# Patient Record
Sex: FEMALE | Race: WHITE | NOT HISPANIC OR LATINO | Employment: FULL TIME | URBAN - METROPOLITAN AREA
[De-identification: names, ages, dates, MRNs, and addresses within clinical notes are randomized per-mention and may not be internally consistent; named-entity substitution may affect disease eponyms.]

---

## 2017-04-20 ENCOUNTER — TELEPHONE (OUTPATIENT)
Dept: ONCOLOGY | Facility: CLINIC | Age: 52
End: 2017-04-20

## 2017-04-20 NOTE — TELEPHONE ENCOUNTER
----- Message from Lidia Emerson sent at 4/20/2017  9:57 AM EDT -----  Regarding: [facility]PAT[facility]  Contact: [phone]  Leonor interiano mom called and wanted to let  know she is in [facility] with pneumonia and is receiving antibiotics and other medications.

## 2017-05-31 PROBLEM — M25.50 DIFFUSE ARTHRALGIA: Status: ACTIVE | Noted: 2017-05-31

## 2017-05-31 PROBLEM — R74.01 ELEVATED TRANSAMINASE LEVEL: Status: ACTIVE | Noted: 2017-05-31

## 2017-05-31 PROBLEM — M85.80 OSTEOPENIA: Status: ACTIVE | Noted: 2017-05-31

## 2017-05-31 PROBLEM — R74.8 ALKALINE PHOSPHATASE ELEVATION: Status: ACTIVE | Noted: 2017-05-31

## 2017-07-17 ENCOUNTER — TRANSCRIBE ORDERS (OUTPATIENT)
Dept: NUTRITION | Facility: HOSPITAL | Age: 52
End: 2017-07-17

## 2017-07-17 DIAGNOSIS — K59.1 FUNCTIONAL DIARRHEA: Primary | ICD-10-CM

## 2017-07-19 ENCOUNTER — TRANSCRIBE ORDERS (OUTPATIENT)
Dept: NUTRITION | Facility: HOSPITAL | Age: 52
End: 2017-07-19

## 2017-08-01 ENCOUNTER — HOSPITAL ENCOUNTER (OUTPATIENT)
Dept: NUTRITION | Facility: HOSPITAL | Age: 52
Setting detail: RECURRING SERIES
Discharge: HOME | End: 2017-08-01

## 2017-08-01 VITALS — WEIGHT: 155 LBS | BODY MASS INDEX: 25.83 KG/M2 | HEIGHT: 65 IN

## 2017-08-01 PROCEDURE — 97802 MEDICAL NUTRITION INDIV IN: CPT | Performed by: DIETITIAN, REGISTERED

## 2017-08-17 ENCOUNTER — HOSPITAL ENCOUNTER (OUTPATIENT)
Dept: NUTRITION | Facility: HOSPITAL | Age: 52
Setting detail: RECURRING SERIES
End: 2017-08-17

## 2017-08-22 ENCOUNTER — LAB (OUTPATIENT)
Dept: LAB | Facility: HOSPITAL | Age: 52
End: 2017-08-22

## 2017-08-22 ENCOUNTER — OFFICE VISIT (OUTPATIENT)
Dept: ONCOLOGY | Facility: CLINIC | Age: 52
End: 2017-08-22

## 2017-08-22 VITALS
DIASTOLIC BLOOD PRESSURE: 78 MMHG | HEART RATE: 92 BPM | TEMPERATURE: 97.9 F | BODY MASS INDEX: 25.11 KG/M2 | SYSTOLIC BLOOD PRESSURE: 120 MMHG | RESPIRATION RATE: 16 BRPM | HEIGHT: 65 IN | WEIGHT: 150.7 LBS

## 2017-08-22 DIAGNOSIS — C50.212 MALIGNANT NEOPLASM OF UPPER-INNER QUADRANT OF LEFT FEMALE BREAST (HCC): ICD-10-CM

## 2017-08-22 DIAGNOSIS — R74.01 ELEVATED TRANSAMINASE LEVEL: ICD-10-CM

## 2017-08-22 DIAGNOSIS — R74.8 ALKALINE PHOSPHATASE ELEVATION: ICD-10-CM

## 2017-08-22 DIAGNOSIS — C50.212 MALIGNANT NEOPLASM OF UPPER-INNER QUADRANT OF LEFT FEMALE BREAST (HCC): Primary | ICD-10-CM

## 2017-08-22 LAB
ALBUMIN SERPL-MCNC: 4.7 G/DL (ref 3.2–4.8)
ALBUMIN/GLOB SERPL: 1.7 G/DL (ref 1.5–2.5)
ALP SERPL-CCNC: 180 U/L (ref 25–100)
ALT SERPL W P-5'-P-CCNC: 61 U/L (ref 7–40)
ANION GAP SERPL CALCULATED.3IONS-SCNC: 8 MMOL/L (ref 3–11)
AST SERPL-CCNC: 37 U/L (ref 0–33)
BILIRUB SERPL-MCNC: 0.5 MG/DL (ref 0.3–1.2)
BUN BLD-MCNC: 13 MG/DL (ref 9–23)
BUN/CREAT SERPL: 14.4 (ref 7–25)
CALCIUM SPEC-SCNC: 9.8 MG/DL (ref 8.7–10.4)
CHLORIDE SERPL-SCNC: 105 MMOL/L (ref 99–109)
CO2 SERPL-SCNC: 27 MMOL/L (ref 20–31)
CREAT BLD-MCNC: 0.9 MG/DL (ref 0.6–1.3)
GFR SERPL CREATININE-BSD FRML MDRD: 67 ML/MIN/1.73
GLOBULIN UR ELPH-MCNC: 2.8 GM/DL
GLUCOSE BLD-MCNC: 91 MG/DL (ref 70–100)
POTASSIUM BLD-SCNC: 3.7 MMOL/L (ref 3.5–5.5)
PROT SERPL-MCNC: 7.5 G/DL (ref 5.7–8.2)
SODIUM BLD-SCNC: 140 MMOL/L (ref 132–146)

## 2017-08-22 PROCEDURE — 99214 OFFICE O/P EST MOD 30 MIN: CPT | Performed by: NURSE PRACTITIONER

## 2017-08-22 PROCEDURE — 80053 COMPREHEN METABOLIC PANEL: CPT | Performed by: NURSE PRACTITIONER

## 2017-08-22 PROCEDURE — 36415 COLL VENOUS BLD VENIPUNCTURE: CPT

## 2017-08-22 RX ORDER — TAMOXIFEN CITRATE 20 MG/1
20 TABLET ORAL DAILY
Qty: 30 TABLET | Refills: 11 | Status: SHIPPED | OUTPATIENT
Start: 2017-08-22 | End: 2022-05-27

## 2017-08-22 NOTE — PROGRESS NOTES
CHIEF COMPLAINT: 1.  Diffuse body aches                                       2.  Follow-up for left breast cancer    Problem List:  Oncology/Hematology History    1. Left breast cancer, [id number], infiltrating ductal carcinoma of the left  breast. Goodrich-Ramirez 4 out of 9, primary 1.8 cm with mucinous features,  stage IA, hormone receptor positive, HER-2/jaswinder 0+:   a) Status post bilateral mastectomy 09/30/2015. Right breast prophylactic  simple mastectomy fibrocystic change with focal fibroadenomata changes, skin  and nipple unremarkable.   b) Low risk Oncotype score of 13 with a 10 year risk of distant recurrence  with 5 years of tamoxifen alone at 8%.  c) Began Arimidex 10/28/2015.   d) Stopped Arimidex, 03/2016 due to diffuse bony aches and pains.   e) Therapy changed to exemestane, 04/20/2016.  2. Elevated alkaline phosphatase:  a) Total body bone scan 11/02/2015 negative.  b) Fractionated alkaline phosphatase 01/25/2016 was normal with intestinal  fraction 3%, bone fraction 33%, liver fraction 64% with total alkaline  phosphatase of 148.  3. History of CHRISTIAN/BSO 2008.  4. Elevated transaminases with right upper quadrant abdominal pain:   a) CT of the abdomen, 04/19/2016 revealed marked steatosis of the liver  without focal liver mass.   b) On 04/19/2016, hepatic function panel with an albumin of 4.7, ALT 99, AST  59, alkaline phosphatase 198, total bilirubin 0.8.   5. Osteopenia: On 04/18/2016, bone density testing revealed osteopenia in the  lumbar spine and femoral necks, overall normal bone mineral density in both  hips.         Malignant neoplasm of upper-inner quadrant of left female breast    9/30/2015 Initial Diagnosis     Malignant neoplasm of upper-inner quadrant of left female breast         11/2/2015 Imaging     Total body bone scan IMPRESSION- Arthritic changes. Negative for metastatic disease.         4/18/2016 Imaging     DEXA Bone Density Scan IMPRESSION- Osteopenia in the lumbar spine. Overall  normal bone mineral  density in both hips with osteopenia in the femoral necks.         2016 Imaging     CT Abdomen IMPRESSION-   1.  Marked steatosis of the liver without focal liver mass. Acute focal  CT abnormality in the abdomen is not otherwise identified.  2.  Evidence of visceral tumor, adenopathy or stranding is not  identified. Signs of active malignancy or metastatic disease is not  appreciated as described.         2016 Imaging     Total body bone scan IMPRESSION-   1.  Negative radionuclide bone data set for focal dominant abnormality  or evidence of active metastatic disease.  2.  Stable tracer distribution when carefully compared to 2015  series.  3.  New or acute abnormalities are not currently identified.            HISTORY OF PRESENT ILLNESS:  The patient is a 51 y.o. female, here for follow up on management of left breast cancer.  The patient states that she was admitted to [facility] in March with double pneumonia and has not felt well since.  She states that she continues to have diffuse bony aches and pains that have been persistent since her breast cancer diagnosis and had been made worse by the medications we have given her.  She was intolerant of Arimidex, we then put her on exemestane and she stopped taking that in April.  She states that she feels better off of the medication.  She states her bony aches are about the same however her feet seem to hurt more, she states when she gets out of bed in the morning the pain in her feet is worse and improves as she moves about.      Past Medical History:   Diagnosis Date   • Fractures    • Malignant neoplasm of upper inner quadrant of female breast    • Stomach problems      Past Surgical History:   Procedure Laterality Date   •  SECTION     • CHOLECYSTECTOMY     • HYSTERECTOMY      CHRISTIAN/BSO   • MASTECTOMY Bilateral 2015    Double   • WRIST SURGERY         No Known Allergies    Family History and  "Social History reviewed and changed as necessary      REVIEW OF SYSTEM:   Review of Systems   Constitutional: Negative for appetite change, chills, diaphoresis, fatigue, fever and unexpected weight change.   HENT:   Negative for mouth sores, sore throat and trouble swallowing.    Eyes: Negative for icterus.   Respiratory: Negative for cough, hemoptysis and shortness of breath.    Cardiovascular: Negative for chest pain, leg swelling and palpitations.   Gastrointestinal: Negative for abdominal distention, abdominal pain, blood in stool, constipation, diarrhea, nausea and vomiting.   Endocrine: Negative for hot flashes.   Genitourinary: Negative for bladder incontinence, difficulty urinating, dysuria, frequency and hematuria.    Musculoskeletal: Positive for diffuse arthralgias, worsen her feet bilaterally.   Skin: Negative for rash.   Neurological: Negative for dizziness, gait problem, headaches, light-headedness and numbness.   Hematological: Negative for adenopathy. Does not bruise/bleed easily.   Psychiatric/Behavioral: Negative for depression. The patient is not nervous/anxious.    All other systems reviewed and are negative except as stated above in the history of present illness.       PHYSICAL EXAM    Vitals:    08/22/17 1505   BP: 120/78   Pulse: 92   Resp: 16   Temp: 97.9 °F (36.6 °C)   TempSrc: Temporal Artery    Weight: 150 lb 11.2 oz (68.4 kg)   Height: 65\" (165.1 cm)     Constitutional: Appears well-developed and well-nourished. No distress.   ECOG: (0) Fully active, able to carry on all predisease performance without restriction  HENT:   Head: Normocephalic.   Mouth/Throat: Oropharynx is clear and moist.   Eyes: Conjunctivae are normal. Pupils are equal, round, and reactive to light. No scleral icterus.   Neck: Neck supple. No JVD present. No thyromegaly present.   Cardiovascular: Normal rate, regular rhythm and normal heart sounds.    Pulmonary/Chest: Breath sounds normal. No respiratory distress. "   Chest: Skin tissue over bilateral reconstructed breast is soft, no abnormal masses or nodules, no rashes or lesions.  Keloid-type scar left axilla that is stable with no unusual tenderness or masses.  Nodes: No cervical, supraclavicular or axillary nodes palpable on exam.  Abdominal: Soft. Exhibits no distension. There is no hepatosplenomegaly. There is no tenderness.   Musculoskeletal:Exhibits no edema, tenderness or deformity.   Neurological: Alert and oriented to person, place, and time. Exhibits normal muscle tone.   Skin: No ecchymosis, no petechiae and no rash noted. Not diaphoretic. No cyanosis. Nails show no clubbing.   Psychiatric: Normal mood and affect.   Vitals reviewed.    Diagnostic data: All previous office notes, previous radiology reports and laboratory data were reviewed at time of visit.    Assessment/Plan     1.  History of stage IA hormone receptor positive left breast cancer  2.  Severe arthralgias worsened with aromatase inhibitor therapy  3.  History of elevated transaminases  4.  MAGANA    Discussion:  The patient has been intolerant of aromatase inhibitors thus far not tolerating Arimidex initially, we then switched her to exemestane and I thought she was tolerating that but when she came in today she informed me that she hasn't been taking it for 3 months and feels better off of it.  We discussed switching to Femara but I am concerned she would not tolerate that either.  We will try a trial of tamoxifen 20 mg daily as she may tolerate this much better.  We did discuss the possibility of not being able to take it due to her history of elevated transaminases.  I will check her CMP today and again in 3 months when I see her back.  She also continues to follow along with Dr. De Jesus in regards to her MAGANA.  If she is intolerant of tamoxifen then she states she will just go with watchful waiting and did not want to try any other adjuvant therapy.  She has had bilateral mastectomies therefore no  mammograms are needed.  She asked about blood work in imaging for surveillance and I discussed with her the reasoning behind no routine scans or blood work in the absence of symptoms.  Her arthralgias are basically stable from what she has had over the past 2 years therefore I do not feel it necessary to repeat her bone scan again as we have done this twice.  She does have significant arthralgias has never seen rheumatology.  She would like to try to see one locally and will ask her primary care provider for referral.  I will see her back in 3 months for follow-up to see how she is tolerating tamoxifen.  I've asked her to please call me if she has any concerns prior to that were she is unable to take the medication and I will be glad to see her sooner.    25 minutes of today's 30 minute appointment was spent in counseling and education regarding the above.          Danielle Stone, APRN    08/22/2017

## 2017-08-23 ENCOUNTER — TELEPHONE (OUTPATIENT)
Dept: ONCOLOGY | Facility: CLINIC | Age: 52
End: 2017-08-23

## 2017-08-23 NOTE — TELEPHONE ENCOUNTER
I called Carole and reviewed the results of her CMP with her.  Transaminases were just a little elevated.  She will begin tamoxifen and we will recheck CMP again after she has been on for a few months.  She understands to notify us if she is intolerant of tamoxifen or has any significant side effects.  Otherwise we will see her back as scheduled in 3 months.

## 2017-08-28 ENCOUNTER — TELEPHONE (OUTPATIENT)
Dept: NUTRITION | Facility: HOSPITAL | Age: 52
End: 2017-08-28

## 2017-08-28 NOTE — PROGRESS NOTES
"Spoke with patient over the phone on progress since initial nutrition appointment with RD at the beginning of the month. Has not been following low FODMAP elimination diet completely but states \"I'm making much more of a conscious effort with my food choices\". Has not been tracking her intake or symptoms. Says at her last oncology appointment all liver enzyme labs were down and she is having \"more normal bowel habits\", and is very pleased by this. Also has lost 2-3 pounds since initial appointment. Is busy between working 4 jobs and granddaughter is sick- does not know when she can come back in for appointment with RD. Has contact information, will call when her schedule is not so busy.  RD instructed patient on how to modify elimination phase for long-term management with FODMAP diet and encouraged keeping a journal with her symptoms to pinpoint trigger foods. Denies having any questions or concerns for RD today. Thank you for this referral.  "

## 2017-09-22 ENCOUNTER — TELEPHONE (OUTPATIENT)
Dept: ONCOLOGY | Facility: CLINIC | Age: 52
End: 2017-09-22

## 2017-09-22 NOTE — TELEPHONE ENCOUNTER
The patient called stating that she would like to make an appointment to discuss side effects from the medication she is taking and her ability to work.  Scheduling will put her on in the next few weeks to see Dr. Best.

## 2017-10-03 ENCOUNTER — LAB (OUTPATIENT)
Dept: LAB | Facility: HOSPITAL | Age: 52
End: 2017-10-03

## 2017-10-03 ENCOUNTER — OFFICE VISIT (OUTPATIENT)
Dept: ONCOLOGY | Facility: CLINIC | Age: 52
End: 2017-10-03

## 2017-10-03 VITALS
TEMPERATURE: 97.7 F | DIASTOLIC BLOOD PRESSURE: 85 MMHG | WEIGHT: 151.6 LBS | SYSTOLIC BLOOD PRESSURE: 126 MMHG | HEIGHT: 65 IN | RESPIRATION RATE: 16 BRPM | HEART RATE: 89 BPM | BODY MASS INDEX: 25.26 KG/M2

## 2017-10-03 DIAGNOSIS — C50.212 MALIGNANT NEOPLASM OF UPPER-INNER QUADRANT OF LEFT FEMALE BREAST, UNSPECIFIED ESTROGEN RECEPTOR STATUS (HCC): ICD-10-CM

## 2017-10-03 DIAGNOSIS — C50.212 MALIGNANT NEOPLASM OF UPPER-INNER QUADRANT OF LEFT FEMALE BREAST, UNSPECIFIED ESTROGEN RECEPTOR STATUS (HCC): Primary | ICD-10-CM

## 2017-10-03 LAB
ALBUMIN SERPL-MCNC: 4.7 G/DL (ref 3.2–4.8)
ALBUMIN/GLOB SERPL: 1.6 G/DL (ref 1.5–2.5)
ALP SERPL-CCNC: 198 U/L (ref 25–100)
ALT SERPL W P-5'-P-CCNC: 84 U/L (ref 7–40)
ANION GAP SERPL CALCULATED.3IONS-SCNC: 7 MMOL/L (ref 3–11)
AST SERPL-CCNC: 47 U/L (ref 0–33)
BILIRUB SERPL-MCNC: 0.6 MG/DL (ref 0.3–1.2)
BUN BLD-MCNC: 11 MG/DL (ref 9–23)
BUN/CREAT SERPL: 11 (ref 7–25)
CALCIUM SPEC-SCNC: 10.3 MG/DL (ref 8.7–10.4)
CHLORIDE SERPL-SCNC: 104 MMOL/L (ref 99–109)
CO2 SERPL-SCNC: 25 MMOL/L (ref 20–31)
CREAT BLD-MCNC: 1 MG/DL (ref 0.6–1.3)
ERYTHROCYTE [DISTWIDTH] IN BLOOD BY AUTOMATED COUNT: 13 % (ref 11.3–14.5)
GFR SERPL CREATININE-BSD FRML MDRD: 59 ML/MIN/1.73
GLOBULIN UR ELPH-MCNC: 2.9 GM/DL
GLUCOSE BLD-MCNC: 92 MG/DL (ref 70–100)
HCT VFR BLD AUTO: 44 % (ref 34.5–44)
HGB BLD-MCNC: 14.6 G/DL (ref 11.5–15.5)
LYMPHOCYTES # BLD AUTO: 2.3 10*3/MM3 (ref 0.6–4.8)
LYMPHOCYTES NFR BLD AUTO: 22.8 % (ref 24–44)
MCH RBC QN AUTO: 30.3 PG (ref 27–31)
MCHC RBC AUTO-ENTMCNC: 33.2 G/DL (ref 32–36)
MCV RBC AUTO: 91.4 FL (ref 80–99)
MONOCYTES # BLD AUTO: 0.4 10*3/MM3 (ref 0–1)
MONOCYTES NFR BLD AUTO: 3.6 % (ref 0–12)
NEUTROPHILS # BLD AUTO: 7.5 10*3/MM3 (ref 1.5–8.3)
NEUTROPHILS NFR BLD AUTO: 73.6 % (ref 41–71)
PLATELET # BLD AUTO: 204 10*3/MM3 (ref 150–450)
PMV BLD AUTO: 9 FL (ref 6–12)
POTASSIUM BLD-SCNC: 3.8 MMOL/L (ref 3.5–5.5)
PROT SERPL-MCNC: 7.6 G/DL (ref 5.7–8.2)
RBC # BLD AUTO: 4.81 10*6/MM3 (ref 3.89–5.14)
SODIUM BLD-SCNC: 136 MMOL/L (ref 132–146)
WBC NRBC COR # BLD: 10.2 10*3/MM3 (ref 3.5–10.8)

## 2017-10-03 PROCEDURE — 99214 OFFICE O/P EST MOD 30 MIN: CPT | Performed by: INTERNAL MEDICINE

## 2017-10-03 PROCEDURE — 85025 COMPLETE CBC W/AUTO DIFF WBC: CPT

## 2017-10-03 PROCEDURE — 80053 COMPREHEN METABOLIC PANEL: CPT | Performed by: INTERNAL MEDICINE

## 2017-10-03 PROCEDURE — 36415 COLL VENOUS BLD VENIPUNCTURE: CPT

## 2017-10-03 NOTE — PROGRESS NOTES
CHIEF COMPLAINT:fu breast cancer management    Problem List:  Oncology/Hematology History    1. Left breast cancer, [id number], infiltrating ductal carcinoma of the left  breast. Goodrich-Ramirez 4 out of 9, primary 1.8 cm with mucinous features,  stage IA, hormone receptor positive, HER-2/jaswinder 0+:   a) Status post bilateral mastectomy 09/30/2015. Right breast prophylactic  simple mastectomy fibrocystic change with focal fibroadenomata changes, skin  and nipple unremarkable.   b) Low risk Oncotype score of 13 with a 10 year risk of distant recurrence  with 5 years of tamoxifen alone at 8%.  c) Began Arimidex 10/28/2015.   d) Stopped Arimidex, 03/2016 due to diffuse bony aches and pains.   e) Therapy changed to exemestane, 04/20/2016.  2. Elevated alkaline phosphatase:  a) Total body bone scan 11/02/2015 negative.  b) Fractionated alkaline phosphatase 01/25/2016 was normal with intestinal  fraction 3%, bone fraction 33%, liver fraction 64% with total alkaline  phosphatase of 148.  3. History of CHRISTIAN/BSO 2008.  4. Elevated transaminases with right upper quadrant abdominal pain:   a) CT of the abdomen, 04/19/2016 revealed marked steatosis of the liver  without focal liver mass.   b) On 04/19/2016, hepatic function panel with an albumin of 4.7, ALT 99, AST  59, alkaline phosphatase 198, total bilirubin 0.8.   5. Osteopenia: On 04/18/2016, bone density testing revealed osteopenia in the  lumbar spine and femoral necks, overall normal bone mineral density in both  hips.         Malignant neoplasm of upper-inner quadrant of left female breast    9/30/2015 Initial Diagnosis     Malignant neoplasm of upper-inner quadrant of left female breast         11/2/2015 Imaging     Total body bone scan IMPRESSION- Arthritic changes. Negative for metastatic disease.         4/18/2016 Imaging     DEXA Bone Density Scan IMPRESSION- Osteopenia in the lumbar spine. Overall normal bone mineral  density in both hips with osteopenia in the femoral  necks.         2016 Imaging     CT Abdomen IMPRESSION-   1.  Marked steatosis of the liver without focal liver mass. Acute focal  CT abnormality in the abdomen is not otherwise identified.  2.  Evidence of visceral tumor, adenopathy or stranding is not  identified. Signs of active malignancy or metastatic disease is not  appreciated as described.         2016 Imaging     Total body bone scan IMPRESSION-   1.  Negative radionuclide bone data set for focal dominant abnormality  or evidence of active metastatic disease.  2.  Stable tracer distribution when carefully compared to 2015  series.  3.  New or acute abnormalities are not currently identified.            HISTORY OF PRESENT ILLNESS:  The patient is a 52 y.o. female, here for follow up on management of breast ca on adjuvant tmx with severe hot flashes and bony aches that have improved in past off rx.  See above for prior manuvers. Had recent left axillary sebaceous cyst rx with atbtx that then caused yeast vaginitis that required diflucan.  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 And knows she can't risk working for fear of hurting others. Was treated and admitted to [facility] in April for Pneumonia.  Still with congestion and chronic cough without fevers.      Past Medical History:   Diagnosis Date   • Fractures    • Malignant neoplasm of upper inner quadrant of female breast    • Stomach problems      Past Surgical History:   Procedure Laterality Date   •  SECTION     • CHOLECYSTECTOMY     • HYSTERECTOMY      CHRISTIAN/BSO   • MASTECTOMY Bilateral 2015    Double   • WRIST SURGERY         No Known Allergies    Family History and Social History reviewed and changed as necessary      REVIEW OF SYSTEM:   Review of Systems   Constitutional: Negative for appetite change, chills, diaphoresis, fatigue, fever and unexpected weight change.   HENT:   Negative for mouth sores, sore throat and trouble  "swallowing.    Eyes: Negative for icterus.   Respiratory: Negative for cough, hemoptysis and shortness of breath.    Cardiovascular: Negative for chest pain, leg swelling and palpitations.   Gastrointestinal: Negative for abdominal distention, abdominal pain, blood in stool, constipation, diarrhea, nausea and vomiting.   Endocrine: Negative for hot flashes.   Genitourinary: Negative for bladder incontinence, difficulty urinating, dysuria, frequency and hematuria.    Musculoskeletal: Negative for gait problem, neck pain and neck stiffness.   Skin: Negative for rash.   Neurological: Negative for dizziness, gait problem, headaches, light-headedness and numbness.   Hematological: Negative for adenopathy. Does not bruise/bleed easily.   Psychiatric/Behavioral: Negative for depression. The patient is not nervous/anxious.    All other systems reviewed and are negative.       PHYSICAL EXAM    Vitals:    10/03/17 1010   BP: 126/85   Pulse: 89   Resp: 16   Temp: 97.7 °F (36.5 °C)   TempSrc: Temporal Artery    Weight: 151 lb 9.6 oz (68.8 kg)   Height: 65\" (165.1 cm)     Constitutional: Appears well-developed and well-nourished. No distress.   ECOG: (2) Ambulatory and capable of self care, unable to carry out work activity, up and about > 50% or waking hours  HENT:   Head: Normocephalic.   Mouth/Throat: Oropharynx is clear and moist.   Eyes: Conjunctivae are normal. Pupils are equal, round, and reactive to light. No scleral icterus.   Neck: Neck supple. No JVD present. No thyromegaly present.   Cardiovascular: Normal rate, regular rhythm and normal heart sounds.    Pulmonary/Chest: Breath sounds normal. No respiratory distress.   Abdominal: Soft. Exhibits no distension and no mass. There is no hepatosplenomegaly. There is no tenderness. There is no rebound and no guarding.   Musculoskeletal:Exhibits no edema, tenderness or deformity.   Neurological: Alert and oriented to person, place, and time. Exhibits normal muscle tone. "   Skin: No ecchymosis, no petechiae and no rash noted. Not diaphoretic. No cyanosis. Nails show no clubbing.   Psychiatric: Normal mood and affect.   Vitals reviewed.      No visits with results within 6 Week(s) from this visit.  Latest known visit with results is:    Lab on 08/22/2017   Component Date Value Ref Range Status   • Glucose 08/22/2017 91  70 - 100 mg/dL Final   • BUN 08/22/2017 13  9 - 23 mg/dL Final   • Creatinine 08/22/2017 0.90  0.60 - 1.30 mg/dL Final   • Sodium 08/22/2017 140  132 - 146 mmol/L Final   • Potassium 08/22/2017 3.7  3.5 - 5.5 mmol/L Final   • Chloride 08/22/2017 105  99 - 109 mmol/L Final   • CO2 08/22/2017 27.0  20.0 - 31.0 mmol/L Final   • Calcium 08/22/2017 9.8  8.7 - 10.4 mg/dL Final   • Total Protein 08/22/2017 7.5  5.7 - 8.2 g/dL Final   • Albumin 08/22/2017 4.70  3.20 - 4.80 g/dL Final   • ALT (SGPT) 08/22/2017 61* 7 - 40 U/L Final   • AST (SGOT) 08/22/2017 37* 0 - 33 U/L Final   • Alkaline Phosphatase 08/22/2017 180* 25 - 100 U/L Final   • Total Bilirubin 08/22/2017 0.5  0.3 - 1.2 mg/dL Final   • eGFR Non  Amer 08/22/2017 67  >60 mL/min/1.73 Final   • Globulin 08/22/2017 2.8  gm/dL Final   • A/G Ratio 08/22/2017 1.7  1.5 - 2.5 g/dL Final   • BUN/Creatinine Ratio 08/22/2017 14.4  7.0 - 25.0 Final   • Anion Gap 08/22/2017 8.0  3.0 - 11.0 mmol/L Final       Assessment/Plan     1.  Breast Cancer  2. Bone Aches  3. Chest congestion  4. Hx of elevated liver enzymes.  5. Hot flashes    Discussion:  Had mucinous features which is a good prognosis.  Had very low risk oncotype.  Will ck bone scan and Ct Chest and see back 1 week.  If no mets, she is going to try to soldier on with TMX but is going to pray about it.        Ebenezer Best MD    10/03/2017

## 2017-10-09 ENCOUNTER — HOSPITAL ENCOUNTER (OUTPATIENT)
Dept: NUCLEAR MEDICINE | Facility: HOSPITAL | Age: 52
Discharge: HOME | End: 2017-10-09
Attending: INTERNAL MEDICINE

## 2017-10-09 ENCOUNTER — OFFICE VISIT (OUTPATIENT)
Dept: ONCOLOGY | Facility: CLINIC | Age: 52
End: 2017-10-09

## 2017-10-09 ENCOUNTER — HOSPITAL ENCOUNTER (OUTPATIENT)
Dept: CT IMAGING | Facility: HOSPITAL | Age: 52
Discharge: HOME | End: 2017-10-09
Attending: INTERNAL MEDICINE | Admitting: INTERNAL MEDICINE

## 2017-10-09 VITALS
WEIGHT: 149.9 LBS | TEMPERATURE: 97.8 F | BODY MASS INDEX: 24.97 KG/M2 | RESPIRATION RATE: 16 BRPM | DIASTOLIC BLOOD PRESSURE: 84 MMHG | SYSTOLIC BLOOD PRESSURE: 134 MMHG | HEART RATE: 65 BPM | HEIGHT: 65 IN

## 2017-10-09 DIAGNOSIS — C50.212 MALIGNANT NEOPLASM OF UPPER-INNER QUADRANT OF LEFT FEMALE BREAST, UNSPECIFIED ESTROGEN RECEPTOR STATUS (HCC): ICD-10-CM

## 2017-10-09 DIAGNOSIS — R74.01 ELEVATED TRANSAMINASE LEVEL: Primary | ICD-10-CM

## 2017-10-09 DIAGNOSIS — M79.622 AXILLARY PAIN, LEFT: ICD-10-CM

## 2017-10-09 DIAGNOSIS — Z85.3 HISTORY OF LEFT BREAST CANCER: ICD-10-CM

## 2017-10-09 PROCEDURE — 71260 CT THORAX DX C+: CPT

## 2017-10-09 PROCEDURE — 99213 OFFICE O/P EST LOW 20 MIN: CPT | Performed by: NURSE PRACTITIONER

## 2017-10-09 PROCEDURE — A9503 TC99M MEDRONATE: HCPCS | Performed by: INTERNAL MEDICINE

## 2017-10-09 PROCEDURE — 0 TECHNETIUM MEDRONATE KIT: Performed by: INTERNAL MEDICINE

## 2017-10-09 PROCEDURE — 78306 BONE IMAGING WHOLE BODY: CPT

## 2017-10-09 PROCEDURE — 0 IOPAMIDOL 61 % SOLUTION: Performed by: INTERNAL MEDICINE

## 2017-10-09 RX ORDER — TC 99M MEDRONATE 20 MG/10ML
25.8 INJECTION, POWDER, LYOPHILIZED, FOR SOLUTION INTRAVENOUS
Status: COMPLETED | OUTPATIENT
Start: 2017-10-09 | End: 2017-10-09

## 2017-10-09 RX ADMIN — IOPAMIDOL 75 ML: 612 INJECTION, SOLUTION INTRAVENOUS at 09:40

## 2017-10-09 RX ADMIN — Medication 25.8 MILLICURIE: at 09:20

## 2017-10-09 NOTE — PROGRESS NOTES
CHIEF COMPLAINT: 1.  Left axillary pain and swelling                                       2.  Diffuse arthralgias                                       3.  Follow-up for history of left breast cancer      Problem List:  Oncology/Hematology History    1. Left breast cancer, [id number], infiltrating ductal carcinoma of the left  breast. Goodrich-Ramirez 4 out of 9, primary 1.8 cm with mucinous features,  stage IA, hormone receptor positive, HER-2/jaswinder 0+:   a) Status post bilateral mastectomy 09/30/2015. Right breast prophylactic  simple mastectomy fibrocystic change with focal fibroadenomata changes, skin  and nipple unremarkable.   b) Low risk Oncotype score of 13 with a 10 year risk of distant recurrence  with 5 years of tamoxifen alone at 8%.  c) Began Arimidex 10/28/2015.   d) Stopped Arimidex, 03/2016 due to diffuse bony aches and pains.   e) Therapy changed to exemestane, 04/20/2016.  2. Elevated alkaline phosphatase:  a) Total body bone scan 11/02/2015 negative.  b) Fractionated alkaline phosphatase 01/25/2016 was normal with intestinal  fraction 3%, bone fraction 33%, liver fraction 64% with total alkaline  phosphatase of 148.  3. History of CHRISTIAN/BSO 2008.  4. Elevated transaminases with right upper quadrant abdominal pain:   a) CT of the abdomen, 04/19/2016 revealed marked steatosis of the liver  without focal liver mass.   b) On 04/19/2016, hepatic function panel with an albumin of 4.7, ALT 99, AST  59, alkaline phosphatase 198, total bilirubin 0.8.   5. Osteopenia: On 04/18/2016, bone density testing revealed osteopenia in the  lumbar spine and femoral necks, overall normal bone mineral density in both  hips.         Malignant neoplasm of upper-inner quadrant of left female breast    9/30/2015 Initial Diagnosis     Malignant neoplasm of upper-inner quadrant of left female breast         11/2/2015 Imaging     Total body bone scan IMPRESSION- Arthritic changes. Negative for metastatic disease.         4/18/2016  Imaging     DEXA Bone Density Scan IMPRESSION- Osteopenia in the lumbar spine. Overall normal bone mineral  density in both hips with osteopenia in the femoral necks.         2016 Imaging     CT Abdomen IMPRESSION-   1.  Marked steatosis of the liver without focal liver mass. Acute focal  CT abnormality in the abdomen is not otherwise identified.  2.  Evidence of visceral tumor, adenopathy or stranding is not  identified. Signs of active malignancy or metastatic disease is not  appreciated as described.         2016 Imaging     Total body bone scan IMPRESSION-   1.  Negative radionuclide bone data set for focal dominant abnormality  or evidence of active metastatic disease.  2.  Stable tracer distribution when carefully compared to 2015  series.  3.  New or acute abnormalities are not currently identified.         10/9/2017 Imaging     CT chest IMPRESSION:  No CT evidence of acute intrathoracic abnormality. No  evidence of metastatic disease. Old healed granulomatous disease seen  within the chest.  Total Body Bone Scan negative, IMPRESSION:  Unremarkable whole body bone scan with no uptake of tracer  to suggest evidence of bony metastatic disease.              HISTORY OF PRESENT ILLNESS:  The patient is a 52 y.o. female, here for follow up on management of History of left breast cancer.  The patient returns today to go over the results of her CT scan and bone scan.  She also has noticed some swelling under her left underarm for which she was treated recently for what was felt to be an infected sweat gland.  She states that it improved with antibiotics but is now starting to come back.  It is tender.  She has had no fevers or chills.      Past Medical History:   Diagnosis Date   • Fractures    • Malignant neoplasm of upper inner quadrant of female breast    • Stomach problems      Past Surgical History:   Procedure Laterality Date   •  SECTION     • CHOLECYSTECTOMY     • HYSTERECTOMY       "CHRISTIAN/BSO   • MASTECTOMY Bilateral 09/30/2015    Double   • WRIST SURGERY         No Known Allergies    Family History and Social History reviewed and changed as necessary      REVIEW OF SYSTEM:   Review of Systems   Constitutional: Negative for appetite change, chills, diaphoresis, fatigue, fever and unexpected weight change.   HENT:   Negative for mouth sores, sore throat and trouble swallowing.    Eyes: Negative for icterus.   Respiratory: Negative for cough, hemoptysis and shortness of breath.    Cardiovascular: Negative for chest pain, leg swelling and palpitations.   Gastrointestinal: Negative for abdominal distention, abdominal pain, blood in stool, constipation, diarrhea, nausea and vomiting.   Endocrine: Negative for hot flashes.   Genitourinary: Negative for bladder incontinence, difficulty urinating, dysuria, frequency and hematuria.    Musculoskeletal: Negative for gait problem, neck pain and neck stiffness.   Skin: Negative for rash.   Neurological: Negative for dizziness, gait problem, headaches, light-headedness and numbness.   Hematological: Negative for adenopathy. Does not bruise/bleed easily.   Psychiatric/Behavioral: Negative for depression. The patient is not nervous/anxious.    All other systems reviewed and are negative except as stated above in the history of present illness.       PHYSICAL EXAM    Vitals:    10/09/17 1541   BP: 134/84   Pulse: 65   Resp: 16   Temp: 97.8 °F (36.6 °C)   TempSrc: Temporal Artery    Weight: 149 lb 14.4 oz (68 kg)   Height: 65\" (165.1 cm)     Constitutional: Appears well-developed and well-nourished. No distress.   ECOG: (1) Restricted in physically strenuous activity, ambulatory and able to do work of light nature  HENT:   Head: Normocephalic.   Mouth/Throat: Oropharynx is clear and moist.   Eyes: Conjunctivae are normal. Pupils are equal, round, and reactive to light. No scleral icterus.   Neck: Neck supple. No JVD present. No thyromegaly present. "   Cardiovascular: Normal rate, regular rhythm and normal heart sounds.    Pulmonary/Chest: Breath sounds normal. No respiratory distress.   Left axilla: Left axilla with approximately 3 cm round subcutaneous nodule, tender to touch, no erythema.  Abdominal: Soft. Exhibits no distension and no mass. There is no hepatosplenomegaly. There is no tenderness. There is no rebound and no guarding.   Musculoskeletal:Exhibits no edema, tenderness or deformity.   Neurological: Alert and oriented to person, place, and time. Exhibits normal muscle tone.   Skin: No ecchymosis, no petechiae and no rash noted. Not diaphoretic. No cyanosis.   Psychiatric: Normal mood and affect.   Vitals reviewed.      Lab on 10/03/2017   Component Date Value Ref Range Status   • Glucose 10/03/2017 92  70 - 100 mg/dL Final   • BUN 10/03/2017 11  9 - 23 mg/dL Final   • Creatinine 10/03/2017 1.00  0.60 - 1.30 mg/dL Final   • Sodium 10/03/2017 136  132 - 146 mmol/L Final   • Potassium 10/03/2017 3.8  3.5 - 5.5 mmol/L Final   • Chloride 10/03/2017 104  99 - 109 mmol/L Final   • CO2 10/03/2017 25.0  20.0 - 31.0 mmol/L Final   • Calcium 10/03/2017 10.3  8.7 - 10.4 mg/dL Final   • Total Protein 10/03/2017 7.6  5.7 - 8.2 g/dL Final   • Albumin 10/03/2017 4.70  3.20 - 4.80 g/dL Final   • ALT (SGPT) 10/03/2017 84* 7 - 40 U/L Final   • AST (SGOT) 10/03/2017 47* 0 - 33 U/L Final   • Alkaline Phosphatase 10/03/2017 198* 25 - 100 U/L Final   • Total Bilirubin 10/03/2017 0.6  0.3 - 1.2 mg/dL Final   • eGFR Non African Amer 10/03/2017 59* >60 mL/min/1.73 Final   • Globulin 10/03/2017 2.9  gm/dL Final   • A/G Ratio 10/03/2017 1.6  1.5 - 2.5 g/dL Final   • BUN/Creatinine Ratio 10/03/2017 11.0  7.0 - 25.0 Final   • Anion Gap 10/03/2017 7.0  3.0 - 11.0 mmol/L Final   • WBC 10/03/2017 10.20  3.50 - 10.80 10*3/mm3 Final   • RBC 10/03/2017 4.81  3.89 - 5.14 10*6/mm3 Final   • Hemoglobin 10/03/2017 14.6  11.5 - 15.5 g/dL Final   • Hematocrit 10/03/2017 44.0  34.5 - 44.0 %  Final   • RDW 10/03/2017 13.0  11.3 - 14.5 % Final   • MCV 10/03/2017 91.4  80.0 - 99.0 fL Final   • MCH 10/03/2017 30.3  27.0 - 31.0 pg Final   • MCHC 10/03/2017 33.2  32.0 - 36.0 g/dL Final   • MPV 10/03/2017 9.0  6.0 - 12.0 fL Final   • Platelets 10/03/2017 204  150 - 450 10*3/mm3 Final   • Neutrophil % 10/03/2017 73.6* 41.0 - 71.0 % Final   • Lymphocyte % 10/03/2017 22.8* 24.0 - 44.0 % Final   • Monocyte % 10/03/2017 3.6  0.0 - 12.0 % Final   • Neutrophils, Absolute 10/03/2017 7.50  1.50 - 8.30 10*3/mm3 Final   • Lymphocytes, Absolute 10/03/2017 2.30  0.60 - 4.80 10*3/mm3 Final   • Monocytes, Absolute 10/03/2017 0.40  0.00 - 1.00 10*3/mm3 Final       Assessment/Plan     1.  History of left breast cancer  2.  Diffuse arthralgias  3.  Left axillary swelling and tenderness  4.  History of elevated liver enzymes  5.  MAGANA    Discussion: Her CT scan and bone scan were negative.  She is going to try to continue on tamoxifen even though it causes her significant diffuse arthralgias to worsen.  She will need adjuvant therapy through October 2020 for 5 years of adjuvant therapy if she can tolerate it that long.  She is still waiting on rheumatology referral that she states she is getting from her primary care provider.  Her transaminases have went up slightly back on tamoxifen, we'll continue to monitor with his CMP every 3 months.  She continues to follow along with Dr. De Jesus in regards to her MAGANA.  In light of the axillary swelling on the left I will get her back to Dr. Briggs for evaluation.  If this is an infected cyst that may need lanced, she may also need ultrasound but I will leave that to his discretion.  I will see her back in 1 year for follow-up.  No plans for any further scanning in the absence of symptoms.      Danielle Stone, APRDENISE    10/09/2017

## 2022-05-27 RX ORDER — AMLODIPINE BESYLATE 5 MG/1
5 TABLET ORAL DAILY
COMMUNITY

## 2022-05-29 PROCEDURE — 93010 ELECTROCARDIOGRAM REPORT: CPT | Performed by: INTERNAL MEDICINE

## 2022-05-31 ENCOUNTER — HOSPITAL ENCOUNTER (OUTPATIENT)
Facility: HOSPITAL | Age: 57
Setting detail: HOSPITAL OUTPATIENT SURGERY
Discharge: HOME | End: 2022-05-31
Attending: PODIATRIST | Admitting: PODIATRIST

## 2022-05-31 ENCOUNTER — ANESTHESIA (OUTPATIENT)
Dept: PERIOP | Facility: HOSPITAL | Age: 57
End: 2022-05-31

## 2022-05-31 ENCOUNTER — ANESTHESIA EVENT (OUTPATIENT)
Dept: PERIOP | Facility: HOSPITAL | Age: 57
End: 2022-05-31

## 2022-05-31 ENCOUNTER — APPOINTMENT (OUTPATIENT)
Dept: GENERAL RADIOLOGY | Facility: HOSPITAL | Age: 57
End: 2022-05-31

## 2022-05-31 VITALS
SYSTOLIC BLOOD PRESSURE: 142 MMHG | DIASTOLIC BLOOD PRESSURE: 93 MMHG | BODY MASS INDEX: 25.83 KG/M2 | TEMPERATURE: 97.8 F | WEIGHT: 155 LBS | OXYGEN SATURATION: 93 % | HEART RATE: 72 BPM | HEIGHT: 65 IN | RESPIRATION RATE: 18 BRPM

## 2022-05-31 DIAGNOSIS — S92.352A DISPLACED FRACTURE OF FIFTH METATARSAL BONE, LEFT FOOT, INITIAL ENCOUNTER FOR CLOSED FRACTURE: Primary | ICD-10-CM

## 2022-05-31 LAB
ANION GAP SERPL CALCULATED.3IONS-SCNC: 13.1 MMOL/L (ref 5–15)
BUN SERPL-MCNC: 13 MG/DL (ref 6–20)
BUN/CREAT SERPL: 17.6 (ref 7–25)
CALCIUM SPEC-SCNC: 9.7 MG/DL (ref 8.6–10.5)
CHLORIDE SERPL-SCNC: 101 MMOL/L (ref 98–107)
CO2 SERPL-SCNC: 24.9 MMOL/L (ref 22–29)
CREAT SERPL-MCNC: 0.74 MG/DL (ref 0.57–1)
DEPRECATED RDW RBC AUTO: 40.5 FL (ref 37–54)
EGFRCR SERPLBLD CKD-EPI 2021: 96.3 ML/MIN/1.73
ERYTHROCYTE [DISTWIDTH] IN BLOOD BY AUTOMATED COUNT: 12.3 % (ref 12.3–15.4)
GLUCOSE SERPL-MCNC: 124 MG/DL (ref 65–99)
HCT VFR BLD AUTO: 39.9 % (ref 34–46.6)
HGB BLD-MCNC: 14.1 G/DL (ref 12–15.9)
MCH RBC QN AUTO: 32.1 PG (ref 26.6–33)
MCHC RBC AUTO-ENTMCNC: 35.3 G/DL (ref 31.5–35.7)
MCV RBC AUTO: 90.9 FL (ref 79–97)
PLATELET # BLD AUTO: 173 10*3/MM3 (ref 140–450)
PMV BLD AUTO: 10.4 FL (ref 6–12)
POTASSIUM SERPL-SCNC: 4 MMOL/L (ref 3.5–5.2)
RBC # BLD AUTO: 4.39 10*6/MM3 (ref 3.77–5.28)
SARS-COV-2 RNA PNL SPEC NAA+PROBE: NOT DETECTED
SODIUM SERPL-SCNC: 139 MMOL/L (ref 136–145)
WBC NRBC COR # BLD: 8.05 10*3/MM3 (ref 3.4–10.8)

## 2022-05-31 PROCEDURE — C1713 ANCHOR/SCREW BN/BN,TIS/BN: HCPCS | Performed by: PODIATRIST

## 2022-05-31 PROCEDURE — 25010000002 FENTANYL CITRATE (PF) 100 MCG/2ML SOLUTION: Performed by: NURSE ANESTHETIST, CERTIFIED REGISTERED

## 2022-05-31 PROCEDURE — 93005 ELECTROCARDIOGRAM TRACING: CPT | Performed by: PODIATRIST

## 2022-05-31 PROCEDURE — 25010000002 PROPOFOL 200 MG/20ML EMULSION: Performed by: NURSE ANESTHETIST, CERTIFIED REGISTERED

## 2022-05-31 PROCEDURE — 25010000002 DEXAMETHASONE PER 1 MG: Performed by: NURSE ANESTHETIST, CERTIFIED REGISTERED

## 2022-05-31 PROCEDURE — 85027 COMPLETE CBC AUTOMATED: CPT | Performed by: PODIATRIST

## 2022-05-31 PROCEDURE — 25010000002 ONDANSETRON PER 1 MG: Performed by: NURSE ANESTHETIST, CERTIFIED REGISTERED

## 2022-05-31 PROCEDURE — C9803 HOPD COVID-19 SPEC COLLECT: HCPCS

## 2022-05-31 PROCEDURE — 76000 FLUOROSCOPY <1 HR PHYS/QHP: CPT | Performed by: PODIATRIST

## 2022-05-31 PROCEDURE — 80048 BASIC METABOLIC PNL TOTAL CA: CPT | Performed by: PODIATRIST

## 2022-05-31 PROCEDURE — 25010000002 HYDROMORPHONE 1 MG/ML SOLUTION: Performed by: NURSE ANESTHETIST, CERTIFIED REGISTERED

## 2022-05-31 PROCEDURE — 87635 SARS-COV-2 COVID-19 AMP PRB: CPT | Performed by: PODIATRIST

## 2022-05-31 PROCEDURE — 0 CEFAZOLIN SODIUM-DEXTROSE 2-3 GM-%(50ML) RECONSTITUTED SOLUTION: Performed by: PODIATRIST

## 2022-05-31 DEVICE — DBX PUTTY, 5CC
Type: IMPLANTABLE DEVICE | Site: FOOT | Status: FUNCTIONAL
Brand: DBX®

## 2022-05-31 DEVICE — IMPLANTABLE DEVICE
Type: IMPLANTABLE DEVICE | Site: FOOT | Status: FUNCTIONAL
Brand: ORTHOLOC

## 2022-05-31 DEVICE — KWIRE SMOTH SGL/TROC RND/END 4X.062IN: Type: IMPLANTABLE DEVICE | Status: FUNCTIONAL

## 2022-05-31 DEVICE — IMPLANTABLE DEVICE
Type: IMPLANTABLE DEVICE | Site: FOOT | Status: FUNCTIONAL
Brand: ORTHOLOC 3DI

## 2022-05-31 RX ORDER — BUPIVACAINE HYDROCHLORIDE 5 MG/ML
INJECTION, SOLUTION EPIDURAL; INTRACAUDAL AS NEEDED
Status: DISCONTINUED | OUTPATIENT
Start: 2022-05-31 | End: 2022-05-31 | Stop reason: HOSPADM

## 2022-05-31 RX ORDER — ONDANSETRON 2 MG/ML
INJECTION INTRAMUSCULAR; INTRAVENOUS AS NEEDED
Status: DISCONTINUED | OUTPATIENT
Start: 2022-05-31 | End: 2022-05-31 | Stop reason: SURG

## 2022-05-31 RX ORDER — LIDOCAINE HYDROCHLORIDE 20 MG/ML
INJECTION, SOLUTION INTRAVENOUS AS NEEDED
Status: DISCONTINUED | OUTPATIENT
Start: 2022-05-31 | End: 2022-05-31 | Stop reason: SURG

## 2022-05-31 RX ORDER — HYDROCODONE BITARTRATE AND ACETAMINOPHEN 5; 325 MG/1; MG/1
1 TABLET ORAL EVERY 4 HOURS PRN
Qty: 30 TABLET | Refills: 0 | Status: SHIPPED | OUTPATIENT
Start: 2022-05-31

## 2022-05-31 RX ORDER — PROPOFOL 10 MG/ML
INJECTION, EMULSION INTRAVENOUS AS NEEDED
Status: DISCONTINUED | OUTPATIENT
Start: 2022-05-31 | End: 2022-05-31 | Stop reason: SURG

## 2022-05-31 RX ORDER — MAGNESIUM HYDROXIDE 1200 MG/15ML
LIQUID ORAL AS NEEDED
Status: DISCONTINUED | OUTPATIENT
Start: 2022-05-31 | End: 2022-05-31 | Stop reason: HOSPADM

## 2022-05-31 RX ORDER — PROCHLORPERAZINE EDISYLATE 5 MG/ML
10 INJECTION INTRAMUSCULAR; INTRAVENOUS ONCE AS NEEDED
Status: DISCONTINUED | OUTPATIENT
Start: 2022-05-31 | End: 2022-05-31 | Stop reason: HOSPADM

## 2022-05-31 RX ORDER — ASPIRIN 325 MG
325 TABLET ORAL DAILY
Qty: 28 TABLET | Refills: 0 | Status: SHIPPED | OUTPATIENT
Start: 2022-06-01 | End: 2022-06-29

## 2022-05-31 RX ORDER — ONDANSETRON 4 MG/1
4 TABLET, FILM COATED ORAL EVERY 8 HOURS PRN
Qty: 20 TABLET | Refills: 0 | Status: SHIPPED | OUTPATIENT
Start: 2022-05-31

## 2022-05-31 RX ORDER — MEPERIDINE HYDROCHLORIDE 25 MG/ML
12.5 INJECTION INTRAMUSCULAR; INTRAVENOUS; SUBCUTANEOUS
Status: DISCONTINUED | OUTPATIENT
Start: 2022-05-31 | End: 2022-05-31 | Stop reason: HOSPADM

## 2022-05-31 RX ORDER — FENTANYL CITRATE 50 UG/ML
INJECTION, SOLUTION INTRAMUSCULAR; INTRAVENOUS AS NEEDED
Status: DISCONTINUED | OUTPATIENT
Start: 2022-05-31 | End: 2022-05-31 | Stop reason: SURG

## 2022-05-31 RX ORDER — LORAZEPAM 2 MG/ML
1 INJECTION INTRAMUSCULAR
Status: DISCONTINUED | OUTPATIENT
Start: 2022-05-31 | End: 2022-05-31 | Stop reason: HOSPADM

## 2022-05-31 RX ORDER — CEFAZOLIN SODIUM 2 G/50ML
2 SOLUTION INTRAVENOUS ONCE
Status: COMPLETED | OUTPATIENT
Start: 2022-05-31 | End: 2022-05-31

## 2022-05-31 RX ORDER — DEXAMETHASONE SODIUM PHOSPHATE 4 MG/ML
INJECTION, SOLUTION INTRA-ARTICULAR; INTRALESIONAL; INTRAMUSCULAR; INTRAVENOUS; SOFT TISSUE AS NEEDED
Status: DISCONTINUED | OUTPATIENT
Start: 2022-05-31 | End: 2022-05-31 | Stop reason: SURG

## 2022-05-31 RX ORDER — SODIUM CHLORIDE, SODIUM LACTATE, POTASSIUM CHLORIDE, CALCIUM CHLORIDE 600; 310; 30; 20 MG/100ML; MG/100ML; MG/100ML; MG/100ML
1000 INJECTION, SOLUTION INTRAVENOUS CONTINUOUS
Status: DISCONTINUED | OUTPATIENT
Start: 2022-05-31 | End: 2022-05-31 | Stop reason: HOSPADM

## 2022-05-31 RX ADMIN — SODIUM CHLORIDE, POTASSIUM CHLORIDE, SODIUM LACTATE AND CALCIUM CHLORIDE 1000 ML: 600; 310; 30; 20 INJECTION, SOLUTION INTRAVENOUS at 10:19

## 2022-05-31 RX ADMIN — FENTANYL CITRATE 100 MCG: 50 INJECTION INTRAMUSCULAR; INTRAVENOUS at 12:55

## 2022-05-31 RX ADMIN — ONDANSETRON 4 MG: 2 INJECTION INTRAMUSCULAR; INTRAVENOUS at 12:48

## 2022-05-31 RX ADMIN — CEFAZOLIN SODIUM 2 G: 2 SOLUTION INTRAVENOUS at 12:48

## 2022-05-31 RX ADMIN — PROPOFOL 200 MG: 10 INJECTION, EMULSION INTRAVENOUS at 12:48

## 2022-05-31 RX ADMIN — DEXAMETHASONE SODIUM PHOSPHATE 4 MG: 4 INJECTION, SOLUTION INTRAMUSCULAR; INTRAVENOUS at 12:48

## 2022-05-31 RX ADMIN — LIDOCAINE HYDROCHLORIDE 60 MG: 20 INJECTION, SOLUTION INTRAVENOUS at 12:48

## 2022-05-31 RX ADMIN — HYDROMORPHONE HYDROCHLORIDE 0.5 MG: 1 INJECTION, SOLUTION INTRAMUSCULAR; INTRAVENOUS; SUBCUTANEOUS at 15:08

## 2022-05-31 NOTE — ANESTHESIA POSTPROCEDURE EVALUATION
Patient: Carole Rodarte    Procedure Summary     Date: 05/31/22 Room / Location: [facility] OR  /  [facility]    Anesthesia Start: 1242 Anesthesia Stop: 1459    Procedure: METATARSAL OPEN REDUCTION INTERNAL FIXATION LEFT FIFTH (Left Foot) Diagnosis:       Displaced fracture of fifth metatarsal bone, left foot, initial encounter for closed fracture      (Displaced fracture of fifth metatarsal bone, left foot, initial encounter for closed fracture [S92.352A])    Surgeons: Santos Varela DPM Provider: Rey Carmen CRNA    Anesthesia Type: general ASA Status: 3          Anesthesia Type: general    Vitals  Vitals Value Taken Time   /94 05/31/22 1457   Temp     Pulse 90 05/31/22 1458   Resp     SpO2 99 % 05/31/22 1458   Vitals shown include unvalidated device data.        Post Anesthesia Care and Evaluation    Patient location during evaluation: PACU  Patient participation: complete - patient participated  Level of consciousness: awake and alert and awake  Pain score: 3  Pain management: adequate  Airway patency: patent  Anesthetic complications: No anesthetic complications  PONV Status: controlled  Cardiovascular status: acceptable, hemodynamically stable and stable  Respiratory status: acceptable and nasal cannula  Hydration status: acceptable    Comments: Vital signs as noted in nursing documentation as per protocol.

## 2022-05-31 NOTE — ANESTHESIA PREPROCEDURE EVALUATION
Anesthesia Evaluation     Patient summary reviewed and Nursing notes reviewed   history of anesthetic complications: prolonged sedation  NPO Solid Status: > 8 hours  NPO Liquid Status: > 8 hours           Airway   Mallampati: I  TM distance: >3 FB  Neck ROM: full  Possible difficult intubation  Dental - normal exam     Pulmonary - normal exam   (+) a smoker,   Cardiovascular - normal exam    Rhythm: regular  Rate: normal    (+) hypertension,       Neuro/Psych  GI/Hepatic/Renal/Endo    (+)  GERD,  liver disease fatty liver disease,     Musculoskeletal     Abdominal  - normal exam    Abdomen: soft.  Bowel sounds: normal.   Substance History   (+) alcohol use,      OB/GYN          Other      history of cancer (Post mastectomy) remission                    Anesthesia Plan    ASA 3     general   (Risks and benefits discussed including risk of aspiration, recall and dental damage. All patient questions answered. Will continue with POC.)  intravenous induction     Anesthetic plan, all risks, benefits, and alternatives have been provided, discussed and informed consent has been obtained with: patient.        CODE STATUS:

## 2022-06-01 LAB
QT INTERVAL: 444 MS
QTC INTERVAL: 446 MS

## 2023-08-11 ENCOUNTER — HOSPITAL ENCOUNTER (EMERGENCY)
Age: 58
Discharge: HOME | End: 2023-08-11
Payer: COMMERCIAL

## 2023-08-11 VITALS
TEMPERATURE: 98.06 F | OXYGEN SATURATION: 96 % | SYSTOLIC BLOOD PRESSURE: 130 MMHG | HEART RATE: 84 BPM | DIASTOLIC BLOOD PRESSURE: 80 MMHG | RESPIRATION RATE: 20 BRPM

## 2023-08-11 VITALS
RESPIRATION RATE: 20 BRPM | OXYGEN SATURATION: 96 % | SYSTOLIC BLOOD PRESSURE: 130 MMHG | TEMPERATURE: 98.1 F | DIASTOLIC BLOOD PRESSURE: 80 MMHG | HEART RATE: 84 BPM

## 2023-08-11 VITALS — BODY MASS INDEX: 25.7 KG/M2

## 2023-08-11 DIAGNOSIS — H66.002: Primary | ICD-10-CM

## 2023-08-11 DIAGNOSIS — E11.9: ICD-10-CM

## 2023-08-11 DIAGNOSIS — E78.5: ICD-10-CM

## 2023-08-11 DIAGNOSIS — Z79.85: ICD-10-CM

## 2023-08-11 DIAGNOSIS — R42: ICD-10-CM

## 2023-08-11 DIAGNOSIS — I10: ICD-10-CM

## 2023-08-11 PROCEDURE — 99214 OFFICE O/P EST MOD 30 MIN: CPT

## 2023-08-11 PROCEDURE — G0463 HOSPITAL OUTPT CLINIC VISIT: HCPCS

## 2023-08-11 PROCEDURE — 99212 OFFICE O/P EST SF 10 MIN: CPT

## 2025-01-17 ENCOUNTER — HOSPITAL ENCOUNTER (OUTPATIENT)
Dept: HOSPITAL 22 - RAD | Age: 60
Discharge: HOME | End: 2025-01-17
Payer: COMMERCIAL

## 2025-01-17 DIAGNOSIS — R94.5: Primary | ICD-10-CM

## 2025-01-17 PROCEDURE — 76705 ECHO EXAM OF ABDOMEN: CPT

## 2025-01-17 NOTE — US_ITS
FINAL REPORT 
 
TECHNIQUE: 
Multiple transverse and longitudinal images 
 
CLINICAL HISTORY: 
ABNORMAL RESULTS OF LIVER FUNCTION 
 
COMPARISON: 
none 
 
FINDINGS: 
Status post cholecystectomy.  No biliary ductal dilatation is 
appreciated.  No fluid collections are seen.  The liver is fatty 
infiltrated but normal in size.  Limited portions of the right 
kidney are unremarkable. 
 
IMPRESSION: 
Fatty liver. 
 
Reviewed, Interpreted and Dictated by FRANCES Pickett MD 
Transcribed by Nata Peters 
 
Authenticated and Electronically Signed by FRANCES Pickett MD on 
01/17/2025 10:07:45 AM [facility]

## (undated) DEVICE — DISPOSABLE TOURNIQUET CUFF SINGLE BLADDER, SINGLE PORT AND QUICK CONNECT CONNECTOR: Brand: COLOR CUFF

## (undated) DEVICE — SLV SCD CALF HEMOFORCE DVT THERP REPROC MD

## (undated) DEVICE — ANTIBACTERIAL UNDYED BRAIDED (POLYGLACTIN 910), SYNTHETIC ABSORBABLE SUTURE: Brand: COATED VICRYL

## (undated) DEVICE — BNDG ELAS MATRX V/CLS 4IN 5YD LF

## (undated) DEVICE — CLAVICLE STRAP: Brand: DEROYAL

## (undated) DEVICE — TBG PENCL TELESCP MEGADYNE SMOKE EVAC 10FT

## (undated) DEVICE — VIOLET BRAIDED (POLYGLACTIN 910), SYNTHETIC ABSORBABLE SUTURE: Brand: COATED VICRYL

## (undated) DEVICE — GLV SURG BIOGEL PI ULTRATOUCH G SZ7 LF BX/50PR

## (undated) DEVICE — 3M™ STERI-DRAPE™ U-DRAPE 1015: Brand: STERI-DRAPE™

## (undated) DEVICE — TEMP FIXATION K-WIRE: Brand: DARCO

## (undated) DEVICE — IMPLANTABLE DEVICE
Type: IMPLANTABLE DEVICE | Site: FOOT | Status: NON-FUNCTIONAL
Brand: ORTHOLOC
Removed: 2022-05-31

## (undated) DEVICE — BALL PIN JURGAN .062 GRN

## (undated) DEVICE — GLV SURG SENSICARE PI LF PF 7.5 GRN STRL

## (undated) DEVICE — SHEET,DRAPE,70X100,STERILE: Brand: MEDLINE

## (undated) DEVICE — SMALL BONES: Brand: ORTHOLOC 3DI

## (undated) DEVICE — FLEXIBLE YANKAUER,MEDIUM TIP, NO VACUUM CONTROL: Brand: ARGYLE

## (undated) DEVICE — COTTON UNDERCAST PADDING,REGULAR FINISH: Brand: WEBRIL

## (undated) DEVICE — GOWN,PREVENTION PLUS,XLARGE,STERILE: Brand: MEDLINE

## (undated) DEVICE — SUT ETHLN 3-0 FS118IN 663H

## (undated) DEVICE — PK EXTRM LOWR 20

## (undated) DEVICE — DRSNG GZ PETROLTM XEROFORM CURAD 1X8IN STRL

## (undated) DEVICE — DRP C/ARMOR